# Patient Record
Sex: MALE | Race: WHITE | NOT HISPANIC OR LATINO | Employment: FULL TIME | URBAN - METROPOLITAN AREA
[De-identification: names, ages, dates, MRNs, and addresses within clinical notes are randomized per-mention and may not be internally consistent; named-entity substitution may affect disease eponyms.]

---

## 2020-01-24 ENCOUNTER — OFFICE VISIT (OUTPATIENT)
Dept: URGENT CARE | Facility: PHYSICIAN GROUP | Age: 25
End: 2020-01-24
Payer: COMMERCIAL

## 2020-01-24 VITALS
WEIGHT: 181 LBS | SYSTOLIC BLOOD PRESSURE: 118 MMHG | OXYGEN SATURATION: 97 % | TEMPERATURE: 98 F | HEART RATE: 82 BPM | BODY MASS INDEX: 22.04 KG/M2 | HEIGHT: 76 IN | DIASTOLIC BLOOD PRESSURE: 72 MMHG | RESPIRATION RATE: 14 BRPM

## 2020-01-24 DIAGNOSIS — J02.0 STREPTOCOCCAL PHARYNGITIS: ICD-10-CM

## 2020-01-24 DIAGNOSIS — J02.9 PHARYNGITIS, UNSPECIFIED ETIOLOGY: ICD-10-CM

## 2020-01-24 LAB
INT CON NEG: NORMAL
INT CON POS: NORMAL
S PYO AG THROAT QL: NORMAL

## 2020-01-24 PROCEDURE — 87880 STREP A ASSAY W/OPTIC: CPT | Performed by: NURSE PRACTITIONER

## 2020-01-24 PROCEDURE — 99204 OFFICE O/P NEW MOD 45 MIN: CPT | Performed by: NURSE PRACTITIONER

## 2020-01-24 RX ORDER — AMOXICILLIN 500 MG/1
500 CAPSULE ORAL 2 TIMES DAILY
Qty: 20 CAP | Refills: 0 | Status: SHIPPED | OUTPATIENT
Start: 2020-01-24 | End: 2020-02-03

## 2020-01-24 SDOH — HEALTH STABILITY: MENTAL HEALTH: HOW OFTEN DO YOU HAVE A DRINK CONTAINING ALCOHOL?: MONTHLY OR LESS

## 2020-01-24 ASSESSMENT — ENCOUNTER SYMPTOMS
SORE THROAT: 1
NAUSEA: 0
COUGH: 1
SHORTNESS OF BREATH: 0
CARDIOVASCULAR NEGATIVE: 1
TROUBLE SWALLOWING: 0
FEVER: 1

## 2020-01-24 NOTE — PATIENT INSTRUCTIONS
Strep Throat  Strep throat is a bacterial infection of the throat. Your health care provider may call the infection tonsillitis or pharyngitis, depending on whether there is swelling in the tonsils or at the back of the throat. Strep throat is most common during the cold months of the year in children who are 5-15 years of age, but it can happen during any season in people of any age. This infection is spread from person to person (contagious) through coughing, sneezing, or close contact.  What are the causes?  Strep throat is caused by the bacteria called Streptococcus pyogenes.  What increases the risk?  This condition is more likely to develop in:  · People who spend time in crowded places where the infection can spread easily.  · People who have close contact with someone who has strep throat.  What are the signs or symptoms?  Symptoms of this condition include:  · Fever or chills.  · Redness, swelling, or pain in the tonsils or throat.  · Pain or difficulty when swallowing.  · White or yellow spots on the tonsils or throat.  · Swollen, tender glands in the neck or under the jaw.  · Red rash all over the body (rare).  How is this diagnosed?  This condition is diagnosed by performing a rapid strep test or by taking a swab of your throat (throat culture test). Results from a rapid strep test are usually ready in a few minutes, but throat culture test results are available after one or two days.  How is this treated?  This condition is treated with antibiotic medicine.  Follow these instructions at home:  Medicines  · Take over-the-counter and prescription medicines only as told by your health care provider.  · Take your antibiotic as told by your health care provider. Do not stop taking the antibiotic even if you start to feel better.  · Have family members who also have a sore throat or fever tested for strep throat. They may need antibiotics if they have the strep infection.  Eating and drinking  · Do not share  food, drinking cups, or personal items that could cause the infection to spread to other people.  · If swallowing is difficult, try eating soft foods until your sore throat feels better.  · Drink enough fluid to keep your urine clear or pale yellow.  General instructions  · Gargle with a salt-water mixture 3-4 times per day or as needed. To make a salt-water mixture, completely dissolve ½-1 tsp of salt in 1 cup of warm water.  · Make sure that all household members wash their hands well.  · Get plenty of rest.  · Stay home from school or work until you have been taking antibiotics for 24 hours.  · Keep all follow-up visits as told by your health care provider. This is important.  Contact a health care provider if:  · The glands in your neck continue to get bigger.  · You develop a rash, cough, or earache.  · You cough up a thick liquid that is green, yellow-brown, or bloody.  · You have pain or discomfort that does not get better with medicine.  · Your problems seem to be getting worse rather than better.  · You have a fever.  Get help right away if:  · You have new symptoms, such as vomiting, severe headache, stiff or painful neck, chest pain, or shortness of breath.  · You have severe throat pain, drooling, or changes in your voice.  · You have swelling of the neck, or the skin on the neck becomes red and tender.  · You have signs of dehydration, such as fatigue, dry mouth, and decreased urination.  · You become increasingly sleepy, or you cannot wake up completely.  · Your joints become red or painful.  This information is not intended to replace advice given to you by your health care provider. Make sure you discuss any questions you have with your health care provider.  Document Released: 12/15/2001 Document Revised: 08/16/2017 Document Reviewed: 04/11/2016  [employer] Interactive Patient Education © 2017 [employer]

## 2020-01-24 NOTE — PROGRESS NOTES
"Subjective:     Jerry Rebolledo is a 24 y.o. male who presents for Pharyngitis (onset 1 week )       Pharyngitis    This is a new problem. Episode onset: 1 week ago. The problem has been gradually worsening. The pain is moderate. Associated symptoms include congestion and coughing. Pertinent negatives include no shortness of breath or trouble swallowing. He has tried acetaminophen (DayQuil) for the symptoms.     PMH:  has no past medical history on file.    MEDS:   Current Outpatient Medications:   •  amoxicillin (AMOXIL) 500 MG Cap, Take 1 Cap by mouth 2 times a day for 10 days., Disp: 20 Cap, Rfl: 0    ALLERGIES: No Known Allergies    SURGHX: History reviewed. No pertinent surgical history.    SOCHX:  reports that he has never smoked. He has never used smokeless tobacco. He reports current alcohol use. He reports that he does not use drugs.     FH: Reviewed with patient, not pertinent to this visit.    Review of Systems   Constitutional: Positive for fever. Negative for malaise/fatigue.   HENT: Positive for congestion and sore throat. Negative for trouble swallowing.    Respiratory: Positive for cough. Negative for shortness of breath.    Cardiovascular: Negative.    Gastrointestinal: Negative for nausea.   All other systems reviewed and are negative.    Additional details per HPI.    Objective:     /72   Pulse 82   Temp 36.7 °C (98 °F) (Temporal)   Resp 14   Ht 1.93 m (6' 4\")   SpO2 97%     Physical Exam  Vitals signs reviewed.   Constitutional:       General: He is not in acute distress.     Appearance: He is well-developed. He is not toxic-appearing or diaphoretic.   HENT:      Head: Normocephalic.      Right Ear: Tympanic membrane and external ear normal.      Left Ear: Tympanic membrane and external ear normal.      Nose: Nose normal.      Mouth/Throat:      Mouth: Mucous membranes are moist.      Pharynx: Uvula midline. Pharyngeal swelling and posterior oropharyngeal erythema present.   Eyes:      " Extraocular Movements: Extraocular movements intact.      Conjunctiva/sclera: Conjunctivae normal.      Pupils: Pupils are equal, round, and reactive to light.   Neck:      Musculoskeletal: Normal range of motion.   Cardiovascular:      Rate and Rhythm: Normal rate and regular rhythm.      Heart sounds: Normal heart sounds.   Pulmonary:      Effort: Pulmonary effort is normal. No respiratory distress.      Breath sounds: Normal breath sounds. No decreased breath sounds, wheezing, rhonchi or rales.   Abdominal:      General: Bowel sounds are normal.      Palpations: Abdomen is soft.      Tenderness: There is no tenderness.   Musculoskeletal: Normal range of motion.         General: No deformity.   Lymphadenopathy:      Cervical: No cervical adenopathy.   Skin:     General: Skin is warm and dry.      Coloration: Skin is not pale.   Neurological:      Mental Status: He is alert and oriented to person, place, and time.      Sensory: No sensory deficit.      Coordination: Coordination normal.   Psychiatric:         Behavior: Behavior normal. Behavior is cooperative.       Rapid Strep A swab: positive       Assessment/Plan:     1. Streptococcal pharyngitis  - amoxicillin (AMOXIL) 500 MG Cap; Take 1 Cap by mouth 2 times a day for 10 days.  Dispense: 20 Cap; Refill: 0    2. Pharyngitis, unspecified etiology  - POCT Rapid Strep A    Rx as above sent electronically.    Advised of contagious nature of strep and to avoid close oral contact. Avoid sharing drinks. Change toothbrush 2 days after starting antibiotic. Perform frequent hand hygiene.    Discussed close monitoring and supportive measures including increasing fluids and rest as well as OTC symptom management per 's instructions. Warm salt water gargles.    Vital signs stable, afebrile, asymptomatic, no acute distress.    Warning signs reviewed. Return precautions discussed.    Patient advised to: Return for 1) Symptoms don't improve or worsen, or go to ER,  2) Follow up with primary care in 7-10 days.    Differential diagnosis, natural history, supportive care, and indications for immediate follow-up discussed. All questions answered. Patient agrees with the plan of care.

## 2020-11-08 ENCOUNTER — OFFICE VISIT (OUTPATIENT)
Dept: URGENT CARE | Facility: PHYSICIAN GROUP | Age: 25
End: 2020-11-08
Payer: COMMERCIAL

## 2020-11-08 ENCOUNTER — HOSPITAL ENCOUNTER (OUTPATIENT)
Facility: MEDICAL CENTER | Age: 25
End: 2020-11-08
Attending: PHYSICIAN ASSISTANT
Payer: COMMERCIAL

## 2020-11-08 VITALS
OXYGEN SATURATION: 96 % | SYSTOLIC BLOOD PRESSURE: 120 MMHG | RESPIRATION RATE: 14 BRPM | HEIGHT: 76 IN | BODY MASS INDEX: 21.92 KG/M2 | DIASTOLIC BLOOD PRESSURE: 82 MMHG | WEIGHT: 180 LBS | TEMPERATURE: 97.9 F | HEART RATE: 88 BPM

## 2020-11-08 DIAGNOSIS — J03.90 EXUDATIVE TONSILLITIS: ICD-10-CM

## 2020-11-08 LAB
INT CON NEG: NORMAL
INT CON POS: NORMAL
S PYO AG THROAT QL: NEGATIVE

## 2020-11-08 PROCEDURE — 99214 OFFICE O/P EST MOD 30 MIN: CPT | Performed by: PHYSICIAN ASSISTANT

## 2020-11-08 PROCEDURE — 87880 STREP A ASSAY W/OPTIC: CPT | Performed by: PHYSICIAN ASSISTANT

## 2020-11-08 PROCEDURE — 87070 CULTURE OTHR SPECIMN AEROBIC: CPT

## 2020-11-08 PROCEDURE — 87077 CULTURE AEROBIC IDENTIFY: CPT

## 2020-11-08 RX ORDER — AMOXICILLIN 500 MG/1
500 CAPSULE ORAL 2 TIMES DAILY
Qty: 20 CAP | Refills: 0 | Status: SHIPPED | OUTPATIENT
Start: 2020-11-08 | End: 2020-11-18

## 2020-11-08 ASSESSMENT — ENCOUNTER SYMPTOMS
COUGH: 1
STRIDOR: 0
HEMOPTYSIS: 0
FEVER: 0
EYE REDNESS: 0
SPUTUM PRODUCTION: 0
SHORTNESS OF BREATH: 0
CHILLS: 0
PALPITATIONS: 0
EYE PAIN: 0
HEADACHES: 1
EYE DISCHARGE: 0
WHEEZING: 0
SORE THROAT: 1

## 2020-11-08 NOTE — PROGRESS NOTES
"Subjective:   Jerry Rebolledo is a 25 y.o. male who presents for Pharyngitis (x3days )      Pharyngitis   This is a new problem. The current episode started in the past 7 days. The problem has been unchanged. Associated symptoms include congestion, coughing, ear pain and headaches. Pertinent negatives include no ear discharge, shortness of breath or stridor.       Review of Systems   Constitutional: Positive for malaise/fatigue. Negative for chills and fever.   HENT: Positive for congestion, ear pain and sore throat. Negative for ear discharge.    Eyes: Negative for pain, discharge and redness.   Respiratory: Positive for cough. Negative for hemoptysis, sputum production, shortness of breath, wheezing and stridor.    Cardiovascular: Negative for chest pain and palpitations.   Skin: Negative for itching and rash.   Neurological: Positive for headaches.   All other systems reviewed and are negative.      Medications:    • This patient does not have an active medication from one of the medication groupers.    Allergies: Patient has no known allergies.    Problem List: Jerry Rebolledo does not have a problem list on file.    Surgical History:  No past surgical history on file.    Past Social Hx: Jerry Rebolledo  reports that he has never smoked. He has never used smokeless tobacco. He reports current alcohol use. He reports that he does not use drugs.     Past Family Hx:  Jerry Rebolledo family history is not on file.     Problem list, medications, and allergies reviewed by myself today in Epic.     Objective:     Blood Pressure 120/82   Pulse 88   Temperature 36.6 °C (97.9 °F) (Temporal)   Respiration 14   Height 1.93 m (6' 4\")   Weight 81.6 kg (180 lb)   Oxygen Saturation 96%   Body Mass Index 21.91 kg/m²     Physical Exam  Vitals signs reviewed.   Constitutional:       General: He is not in acute distress.     Appearance: He is well-developed. He is not ill-appearing or toxic-appearing.   HENT:      Head: " Normocephalic and atraumatic.      Right Ear: Hearing, tympanic membrane, ear canal and external ear normal.      Left Ear: Hearing, tympanic membrane, ear canal and external ear normal.      Nose: Nose normal.      Mouth/Throat:      Pharynx: Uvula midline. Oropharyngeal exudate and posterior oropharyngeal erythema present.      Tonsils: No tonsillar abscesses.   Neck:      Musculoskeletal: Normal range of motion and neck supple.      Thyroid: No thyromegaly.      Vascular: No JVD.      Trachea: No tracheal deviation.   Cardiovascular:      Rate and Rhythm: Regular rhythm.      Heart sounds: Normal heart sounds. No murmur. No friction rub. No gallop.    Pulmonary:      Effort: Pulmonary effort is normal. No respiratory distress.      Breath sounds: Normal breath sounds. No stridor. No wheezing or rales.   Chest:      Chest wall: No tenderness.   Lymphadenopathy:      Cervical: Cervical adenopathy present.   Skin:     General: Skin is warm and dry.   Neurological:      Mental Status: He is alert and oriented to person, place, and time.   Psychiatric:         Behavior: Behavior normal.         Thought Content: Thought content normal.         Judgment: Judgment normal.         Assessment/Plan:     Medical Decision Making/Comments   Pt is a 25 yr old male who presents for evaluation of a sore throat.  Pt states having symptoms for 2 days with other URI symptoms.  Pt denies red flag symptoms of stiff neck or unable to handle oral secretions. Pt is up to date with vaccinations.  Vital signs normal.  Pt appears well and non-toxic.  Exam shows erythema of the posterior pharynx with enlarged tonsils and exudate.  Pt has soft and supple neck with full ROM.  There is no tender cervical lymphadenopathy, muffled voice, trismus, posterior oral pharyngeal swelling or uvula deviation. Rapid strep is negative but due to the appearance of post pharynx will start on antibiotics while culture is pending.    Diagnosis differential  includes but not limited to: bacterial pharyngitis, viral pharyngitis, stomatitis, candida albicans.            Diagnosis and associated orders     1. Sore throat  POCT Rapid Strep A   - Warm salt water gargles  - NSAIDs/Acetamenopohen PRN  - Throat lozenges PRN  - Cool mist humidifier  - Increase hydration/solid diet as tolerated               Differential diagnosis, natural history, supportive care, and indications for immediate follow-up discussed.    Advised the patient to follow-up with the primary care physician for recheck, reevaluation, and consideration of further management.    Please note that this dictation was created using voice recognition software. I have made a reasonable attempt to correct obvious errors, but I expect that there are errors of grammar and possibly content that I did not discover before finalizing the note.

## 2020-11-09 DIAGNOSIS — J03.90 EXUDATIVE TONSILLITIS: ICD-10-CM

## 2020-11-11 LAB
BACTERIA SPEC RESP CULT: ABNORMAL
BACTERIA SPEC RESP CULT: ABNORMAL
SIGNIFICANT IND 70042: ABNORMAL
SITE SITE: ABNORMAL
SOURCE SOURCE: ABNORMAL

## 2021-09-29 ENCOUNTER — TELEPHONE (OUTPATIENT)
Dept: SCHEDULING | Facility: IMAGING CENTER | Age: 26
End: 2021-09-29

## 2022-12-01 ENCOUNTER — HOSPITAL ENCOUNTER (OUTPATIENT)
Dept: RADIOLOGY | Facility: MEDICAL CENTER | Age: 27
End: 2022-12-01
Attending: FAMILY MEDICINE
Payer: COMMERCIAL

## 2022-12-01 ENCOUNTER — OFFICE VISIT (OUTPATIENT)
Dept: URGENT CARE | Facility: PHYSICIAN GROUP | Age: 27
End: 2022-12-01
Payer: COMMERCIAL

## 2022-12-01 VITALS
WEIGHT: 190 LBS | TEMPERATURE: 98.1 F | BODY MASS INDEX: 23.14 KG/M2 | HEART RATE: 87 BPM | HEIGHT: 76 IN | SYSTOLIC BLOOD PRESSURE: 120 MMHG | DIASTOLIC BLOOD PRESSURE: 68 MMHG | OXYGEN SATURATION: 95 % | RESPIRATION RATE: 18 BRPM

## 2022-12-01 DIAGNOSIS — S69.91XA HAND INJURY, RIGHT, INITIAL ENCOUNTER: ICD-10-CM

## 2022-12-01 PROCEDURE — 73130 X-RAY EXAM OF HAND: CPT | Mod: RT

## 2022-12-01 PROCEDURE — 99213 OFFICE O/P EST LOW 20 MIN: CPT | Performed by: FAMILY MEDICINE

## 2022-12-01 RX ORDER — ATOMOXETINE 40 MG/1
CAPSULE ORAL
COMMUNITY
Start: 2022-11-08

## 2022-12-01 ASSESSMENT — ENCOUNTER SYMPTOMS
TINGLING: 0
FEVER: 0
SENSORY CHANGE: 0

## 2022-12-01 NOTE — PROGRESS NOTES
"Subjective:     Jerry Rebolledo is a 27 y.o. male who presents for Hand Injury (Hit hand during a fall last weekend, swollen, painful, R hand, worried about a fracture)    HPI  Pt presents for evaluation of an acute problem  Patient with a right hand injury which happened about a week ago  Hit ulnar hand on a railing   Has FROM, but has pain   Has pain in the ulnar hand   No numbness or tingling   Has increased pain with strong  or any twisting of wrist   Has a small amount of swelling  No bruising or erythema    Review of Systems   Constitutional:  Negative for fever.   Skin:  Negative for rash.   Neurological:  Negative for tingling and sensory change.     PMH:  has no past medical history on file.  MEDS:   Current Outpatient Medications:     atomoxetine (STRATTERA) 40 MG capsule, TAKE 1 CAPSULE BY MOUTH EVERY MORNING FOR 7 DAYS THEN INCREASE TO 80 MG DAILY, Disp: , Rfl:   ALLERGIES: No Known Allergies  SURGHX: History reviewed. No pertinent surgical history.  SOCHX:  reports that he has never smoked. He has never used smokeless tobacco. He reports current alcohol use. He reports that he does not use drugs.     Objective:   /68   Pulse 87   Temp 36.7 °C (98.1 °F)   Resp 18   Ht 1.93 m (6' 4\")   Wt 86.2 kg (190 lb)   SpO2 95%   BMI 23.13 kg/m²     Physical Exam  Constitutional:       General: He is not in acute distress.     Appearance: He is well-developed. He is not diaphoretic.   Pulmonary:      Effort: Pulmonary effort is normal.   Neurological:      Mental Status: He is alert.   Right wrist/hand  General: no gross deformity, ecchymosis, or erythema  Palpation: TTP focally along the bony prominence of the fifth metacarpal  ROM: FROM   Neuro: median, radial, ulnar nerves intact on testing  Vascular: radial, ulnar pulses 2+ and symmetric, cap refill <2 sec=    Assessment/Plan:   Assessment    1. Hand injury, right, initial encounter  - DX-HAND 3+ RIGHT; Future  - Referral to Orthopedics    Other " orders  - atomoxetine (STRATTERA) 40 MG capsule; TAKE 1 CAPSULE BY MOUTH EVERY MORNING FOR 7 DAYS THEN INCREASE TO 80 MG DAILY    Patient with fifth metacarpal fracture.  Patient placed into ulnar gutter splint and referred to orthopedics urgently.  Advised to use day in the splint until he sees orthopedic for follow-up.

## 2022-12-07 ENCOUNTER — HOSPITAL ENCOUNTER (OUTPATIENT)
Dept: RADIOLOGY | Facility: MEDICAL CENTER | Age: 27
End: 2022-12-07
Attending: PHYSICIAN ASSISTANT
Payer: COMMERCIAL

## 2022-12-07 DIAGNOSIS — S62.316S CLOSED DISPLACED FRACTURE OF BASE OF FIFTH METACARPAL BONE OF RIGHT HAND, SEQUELA: ICD-10-CM

## 2022-12-07 DIAGNOSIS — S92.301A: ICD-10-CM

## 2022-12-07 PROCEDURE — 73200 CT UPPER EXTREMITY W/O DYE: CPT | Mod: RT

## 2024-05-20 ENCOUNTER — OFFICE VISIT (OUTPATIENT)
Dept: URGENT CARE | Facility: PHYSICIAN GROUP | Age: 29
End: 2024-05-20
Payer: COMMERCIAL

## 2024-05-20 ENCOUNTER — HOSPITAL ENCOUNTER (OUTPATIENT)
Dept: RADIOLOGY | Facility: MEDICAL CENTER | Age: 29
End: 2024-05-20
Attending: NURSE PRACTITIONER
Payer: COMMERCIAL

## 2024-05-20 VITALS
TEMPERATURE: 97.8 F | HEART RATE: 70 BPM | OXYGEN SATURATION: 98 % | RESPIRATION RATE: 16 BRPM | DIASTOLIC BLOOD PRESSURE: 86 MMHG | WEIGHT: 196.65 LBS | BODY MASS INDEX: 23.95 KG/M2 | HEIGHT: 76 IN | SYSTOLIC BLOOD PRESSURE: 138 MMHG

## 2024-05-20 DIAGNOSIS — S82.822A CLOSED TORUS FRACTURE OF DISTAL END OF LEFT FIBULA, INITIAL ENCOUNTER: ICD-10-CM

## 2024-05-20 DIAGNOSIS — M25.572 ACUTE LEFT ANKLE PAIN: ICD-10-CM

## 2024-05-20 PROCEDURE — 3075F SYST BP GE 130 - 139MM HG: CPT | Performed by: NURSE PRACTITIONER

## 2024-05-20 PROCEDURE — 99214 OFFICE O/P EST MOD 30 MIN: CPT | Performed by: NURSE PRACTITIONER

## 2024-05-20 PROCEDURE — 3079F DIAST BP 80-89 MM HG: CPT | Performed by: NURSE PRACTITIONER

## 2024-05-20 RX ORDER — HYDROCODONE BITARTRATE AND ACETAMINOPHEN 5; 325 MG/1; MG/1
1 TABLET ORAL EVERY 6 HOURS PRN
Qty: 12 TABLET | Refills: 0 | Status: SHIPPED | OUTPATIENT
Start: 2024-05-20 | End: 2024-05-23

## 2024-05-20 ASSESSMENT — ENCOUNTER SYMPTOMS
BACK PAIN: 0
CHILLS: 0
MYALGIAS: 0
TINGLING: 0
SENSORY CHANGE: 0
FOCAL WEAKNESS: 0
FEVER: 0

## 2024-05-20 NOTE — PROGRESS NOTES
Subjective     Jerry Rebolledo is a 28 y.o. male who presents with Ankle Injury (Rolled left ankle, stepped off curb and landed wrong, pain /X 1 day )            HPI  New problem.  Patient is a very pleasant 28-year-old male who presents with a rolled left ankle.  He apparently stepped off a curb after hiking yesterday and landed wrong.  He states he felt a small pop.  He is significant swelling along the lateral malleolus and pain with ambulation.  He has a extremity wrapped and he has been doing ice and ibuprofen and Tylenol.  He denies any distal paresthesia, foot pain, or knee pain.    Patient has no known allergies.  Current Outpatient Medications on File Prior to Visit   Medication Sig Dispense Refill    atomoxetine (STRATTERA) 40 MG capsule TAKE 1 CAPSULE BY MOUTH EVERY MORNING FOR 7 DAYS THEN INCREASE TO 80 MG DAILY       No current facility-administered medications on file prior to visit.     Social History     Socioeconomic History    Marital status: Single     Spouse name: Not on file    Number of children: Not on file    Years of education: Not on file    Highest education level: Not on file   Occupational History    Not on file   Tobacco Use    Smoking status: Never    Smokeless tobacco: Never   Substance and Sexual Activity    Alcohol use: Yes    Drug use: Never    Sexual activity: Not on file   Other Topics Concern    Not on file   Social History Narrative    Not on file     Social Determinants of Health     Financial Resource Strain: Not on file   Food Insecurity: Not on file   Transportation Needs: Not on file   Physical Activity: Not on file   Stress: Not on file   Social Connections: Not on file   Intimate Partner Violence: Not on file   Housing Stability: Not on file     Breast Cancer-related family history is not on file.      Review of Systems   Constitutional:  Negative for chills and fever.   Musculoskeletal:  Positive for joint pain. Negative for back pain and myalgias.   Skin: Negative.   "  Neurological:  Negative for tingling, sensory change and focal weakness.              Objective     /86   Pulse 70   Temp 36.6 °C (97.8 °F) (Temporal)   Resp 16   Ht 1.93 m (6' 4\")   Wt 89.2 kg (196 lb 10.4 oz)   SpO2 98%   BMI 23.94 kg/m²      Physical Exam  Vitals and nursing note reviewed.   Constitutional:       Appearance: Normal appearance.   Cardiovascular:      Rate and Rhythm: Normal rate and regular rhythm.      Heart sounds: No murmur heard.  Pulmonary:      Effort: Pulmonary effort is normal.      Breath sounds: Normal breath sounds.   Musculoskeletal:      Left ankle: Swelling and ecchymosis present. Tenderness present over the lateral malleolus. No base of 5th metatarsal tenderness. Decreased range of motion.      Left Achilles Tendon: Normal.   Skin:     General: Skin is warm and dry.      Findings: Bruising present.      Comments: Mild bruising over lateral malleolus.   Neurological:      General: No focal deficit present.      Mental Status: He is alert and oriented to person, place, and time.                             Assessment & Plan        1. Closed torus fracture of distal end of left fibula, initial encounter  HYDROcodone-acetaminophen (NORCO) 5-325 MG Tab per tablet    Referral to Orthopedics      2. Acute left ankle pain  DX-ANKLE 3+ VIEWS LEFT        Transverse non displaced fracture of distal fibula.  Boot/crutches.  Reviewed opioid screen and consent with patient.  Reviewed PDMP- no risk identified.  Norco.  Ibuprofen and icing.  Ortho referral  Patient is cautioned on sedation potential of narcotic medication; no drinking, driving or operating heavy machinery while on this medication.                  "

## 2025-04-25 ENCOUNTER — PHARMACY VISIT (OUTPATIENT)
Dept: PHARMACY | Facility: MEDICAL CENTER | Age: 30
End: 2025-04-25
Payer: COMMERCIAL

## 2025-04-25 PROCEDURE — RXMED WILLOW AMBULATORY MEDICATION CHARGE: Performed by: PSYCHIATRY & NEUROLOGY

## 2025-04-25 RX ORDER — DEXTROAMPHETAMINE SACCHARATE, AMPHETAMINE ASPARTATE, DEXTROAMPHETAMINE SULFATE AND AMPHETAMINE SULFATE 2.5; 2.5; 2.5; 2.5 MG/1; MG/1; MG/1; MG/1
TABLET ORAL
Qty: 30 TABLET | Refills: 0 | OUTPATIENT
Start: 2025-04-25

## 2025-04-25 RX ORDER — DEXTROAMPHETAMINE SACCHARATE, AMPHETAMINE ASPARTATE MONOHYDRATE, DEXTROAMPHETAMINE SULFATE AND AMPHETAMINE SULFATE 6.25; 6.25; 6.25; 6.25 MG/1; MG/1; MG/1; MG/1
CAPSULE, EXTENDED RELEASE ORAL
Qty: 30 CAPSULE | Refills: 0 | OUTPATIENT
Start: 2025-04-25

## 2025-04-25 RX ORDER — VORTIOXETINE 10 MG/1
10 TABLET, FILM COATED ORAL DAILY
Qty: 30 TABLET | Refills: 2 | OUTPATIENT
Start: 2025-04-25

## 2025-05-29 ENCOUNTER — PHARMACY VISIT (OUTPATIENT)
Dept: PHARMACY | Facility: MEDICAL CENTER | Age: 30
End: 2025-05-29
Payer: COMMERCIAL

## 2025-05-29 PROCEDURE — RXMED WILLOW AMBULATORY MEDICATION CHARGE: Performed by: PSYCHIATRY & NEUROLOGY

## 2025-07-17 ENCOUNTER — PHARMACY VISIT (OUTPATIENT)
Dept: PHARMACY | Facility: MEDICAL CENTER | Age: 30
End: 2025-07-17
Payer: COMMERCIAL

## 2025-07-17 PROCEDURE — RXMED WILLOW AMBULATORY MEDICATION CHARGE: Performed by: PSYCHIATRY & NEUROLOGY

## 2025-07-17 RX ORDER — DEXTROAMPHETAMINE SACCHARATE, AMPHETAMINE ASPARTATE, DEXTROAMPHETAMINE SULFATE AND AMPHETAMINE SULFATE 2.5; 2.5; 2.5; 2.5 MG/1; MG/1; MG/1; MG/1
TABLET ORAL
Qty: 30 TABLET | Refills: 0 | OUTPATIENT
Start: 2025-07-17

## 2025-08-27 ENCOUNTER — PHARMACY VISIT (OUTPATIENT)
Dept: PHARMACY | Facility: MEDICAL CENTER | Age: 30
End: 2025-08-27
Payer: COMMERCIAL

## 2025-08-27 PROCEDURE — RXMED WILLOW AMBULATORY MEDICATION CHARGE: Performed by: PSYCHIATRY & NEUROLOGY

## 2025-08-27 RX ORDER — LISDEXAMFETAMINE DIMESYLATE 40 MG/1
CAPSULE ORAL
Qty: 30 CAPSULE | Refills: 0 | OUTPATIENT
Start: 2025-08-27

## 2025-08-27 RX ORDER — VORTIOXETINE 10 MG/1
10 TABLET, FILM COATED ORAL DAILY
Qty: 30 TABLET | Refills: 2 | OUTPATIENT
Start: 2025-08-27